# Patient Record
(demographics unavailable — no encounter records)

---

## 2024-11-04 NOTE — CARDIOLOGY SUMMARY
[de-identified] : EKGs from the hospital reviewed which is showing normal sinus rhythm with PACs EKG done today March 22, 2024 normal sinus rhythm/sinus bradycardia.  PACs.  Possible anteroseptal infarct of undetermined age. [de-identified] : Event monitors.  2024.  Multiple episodes of brief PSVT.  No significant long episodes to suggest atrial fibrillation. [de-identified] : Echocardiogram.  January 28, 2024 EF 55 to 60% moderate LV diastolic dysfunction PASP 35 mild MR TR moderate aortic stenosis and aortic regurgitation.

## 2024-11-04 NOTE — REASON FOR VISIT
[Arrhythmia/ECG Abnorrmalities] : arrhythmia/ECG abnormalities [Structural Heart and Valve Disease] : structural heart and valve disease [Hypertension] : hypertension [FreeTextEntry3] : dR. Ramachandran [FreeTextEntry1] : MATILDE BARNHART  is a 90 year F  who presents today Nov 04, 2024 in clinical follow-up. Last week she noticed increased palpitations. Evaluated at walk in clinic and given Metoprolol 50mg QD and instructed to follow up. She did not take the Metoprolol today and HR's in the 50's. Palpitations are better today.   Overall she has been feeling well. There has been no recent illness or hospital stay. Asymptomatic from cardiovascular standpoint.    90-year-old white female is referred to me for consultation in presence of history of 1.  Essential hypertension without any history of CHF CKD.  She is non-smoker 2.  History of transient atrial fibrillation during hospital admission for pneumonia and UTI January 28, 2024 hospital admission at [facility] [place].  Was started on Eliquis 2.5 mg twice daily.  Without any complications so far.  No symptomatic episodes of atrial fibrillation. 3.  History of borderline pulmonary hypertension, moderate aortic stenosis/aortic regurgitation with preserved LV ejection fraction. 4.  Gastroesophageal reflux disease.

## 2024-11-04 NOTE — ADDENDUM
[FreeTextEntry1] : I been asked to do preoperative cardiac assessment prior to epidural injection and management. Based on when I saw her on August 22, 2024 she is stable from cardiovascular point of view. Considering she is on NOAC for her atrial fibrillation which was paroxysmal Would recommend to hold Xarelto at least for 48-72 hours. Follow the protocol from pain management regarding duration of holding anticoagulation. Risk benefits alternatives should be noted when you hold anticoagulation for any procedure. Please call us for any questions.

## 2024-11-04 NOTE — PHYSICAL EXAM
[Well Developed] : well developed [Well Nourished] : well nourished [No Acute Distress] : no acute distress [Normal S1, S2] : normal S1, S2 [Murmur] : murmur [Clear Lung Fields] : clear lung fields [Normal Gait] : normal gait [No Edema] : no edema [Normal Radial B/L] : normal radial B/L [Normal Speech] : normal speech [Alert and Oriented] : alert and oriented

## 2024-11-04 NOTE — REVIEW OF SYSTEMS
[Hearing Loss] : hearing loss [Heartburn] : heartburn [Negative] : Heme/Lymph [FreeTextEntry5] : As per HPI

## 2024-11-04 NOTE — DISCUSSION/SUMMARY
[FreeTextEntry1] : MATILDE BARNHART  is a 90 year F  who presents today Nov 04, 2024 with the above history and the following active issues.   Paroxysmal atrial fibrillation.  [id number] 2 score is 4.  I have reviewed risk benefits alternatives of NOAC at her age in relation to bleeding risk versus prevention of cardioembolic event.  On low-dose Eliquis based on her age and weight.  High risk medication use.  Given recent symptoms of palpitations recommend 1 week ZIO and follow-up labs. Hold off on taking Metoprolol at this time until we get the information from [facility].  EKG today demonstrates SR  Moderate aortic stenosis aortic regurgitation mild MR TR and borderline pulmonary pressures.  EF 55 to 60%.  Clinically no signs of left or right heart failure.  Surveillance echocardiogram in future or for change in clinical status.  Discussed with her.  No indication for intervention at present.  Essential hypertension.  No CHF.  No CKD.  Non-smoker.  Continue to follow.  Continue present regimen medication.  Labs ordered.  Sinus bradycardia.  Asymptomatic.  Moderate aortic stenosis and mitral regurgitation preserved EF.    Red flag symptoms which would warrant sooner emergent evaluation reviewed with the patient.  Questions and concerns were addressed and answered.  Limitations of non-invasive testing reviewed  Sincerely,  Jeanne Coy PA-C Patients history, testing and plan reviewed with supervising MD: Dr. Dominag Vidal

## 2024-12-12 NOTE — REASON FOR VISIT
[Arrhythmia/ECG Abnorrmalities] : arrhythmia/ECG abnormalities [Structural Heart and Valve Disease] : structural heart and valve disease [Hypertension] : hypertension [FreeTextEntry3] : dR. Ramachandran [FreeTextEntry1] : MATILDE BARNHART  is a 90 year F  who presents today in clinical follow-up. No further palpitations.  Has not been taking her metoprolol.   Overall she has been feeling well. There has been no recent illness or hospital stay. Asymptomatic from cardiovascular standpoint.    90-year-old white female is referred to me for consultation in presence of history of 1.  Essential hypertension without any history of CHF CKD.  She is non-smoker 2.  History of transient atrial fibrillation during hospital admission for pneumonia and UTI January 28, 2024 hospital admission at [facility] [place].  Was started on Eliquis 2.5 mg twice daily.  Now on Xarelto.  Without any complications so far.  No symptomatic episodes of atrial fibrillation. 3.  History of borderline pulmonary hypertension, moderate aortic stenosis/aortic regurgitation with preserved LV ejection fraction. 4.  Gastroesophageal reflux disease.

## 2024-12-12 NOTE — PHYSICAL EXAM
[Frail] : frail [Normal S1, S2] : normal S1, S2 [Murmur] : murmur [Clear Lung Fields] : clear lung fields [No Edema] : no edema [de-identified] : kee 2-3/6

## 2024-12-12 NOTE — ASSESSMENT
[FreeTextEntry1] : Reviewed on December 12, 2024. EKG from today reviewed showing normal sinus rhythm/sinus bradycardia Recent labs from November 8, 2024 CBC hemoglobin 11.5 platelet count 188 sodium 139 potassium 4.3 creatinine 0.68 GFR 82  TSH 2.03 Event monitor reviewed.

## 2024-12-12 NOTE — CARDIOLOGY SUMMARY
[de-identified] : December 12, 2024.  Normal sinus rhythm next EKGs from the hospital reviewed which is showing normal sinus rhythm with PACs EKG done today March 22, 2024 normal sinus rhythm/sinus bradycardia.  PACs.  Possible anteroseptal infarct of undetermined age. [de-identified] : Event monitors.  2024.  Multiple episodes of brief PSVT.  No significant long episodes to suggest atrial fibrillation. Event monitor November 2024.  PSVT.  Was unable to keep the monitor on because of skin reaction. [de-identified] : Echocardiogram.  January 28, 2024 EF 55 to 60% moderate LV diastolic dysfunction PASP 35 mild MR TR moderate aortic stenosis and aortic regurgitation.

## 2024-12-12 NOTE — DISCUSSION/SUMMARY
[FreeTextEntry1] : MATILDE BARNHART  is a 90 year F  who presents today  with the above history and the following active issues.   Paroxysmal atrial fibrillation.  [id number] 2 score is 4.  I have reviewed risk benefits alternatives of NOAC at her age in relation to bleeding risk versus prevention of cardioembolic event.  On low-dose Eliquis based on her age and weight.  High risk medication use.  Hold off on taking Metoprolol at this time until we get the information from [facility].  As needed use of metoprolol recommended. EKG today demonstrates SR  Moderate aortic stenosis aortic regurgitation mild MR TR and borderline pulmonary pressures.  EF 55 to 60%.  Clinically no signs of left or right heart failure.  Surveillance echocardiogram in future or for change in clinical status.  Discussed with her.  No indication for intervention at present.  Essential hypertension.  No CHF.  No CKD.  Non-smoker.  Continue to follow.  Continue present regimen medication.  Labs ordered.  Sinus bradycardia.  Asymptomatic.  Metoprolol to be used as needed for PAF or palpitations.   Red flag symptoms which would warrant sooner emergent evaluation reviewed with the patient.  Questions and concerns were addressed and answered.  Limitations of non-invasive testing reviewed All the above were at length reviewed. Answered all the questions. Thank you very much for this kind referral. Please do not hesitate to give me a call for any question. Part of this transcription was done with voice recognition software and phonetically similar errors are common. I apologize for that. Please do not hesitate to call for any questions due to above.   Sincerely,   Dominga Vidal MD, [facility], [place]   [EKG obtained to assist in diagnosis and management of assessed problem(s)] : EKG obtained to assist in diagnosis and management of assessed problem(s)

## 2025-06-23 NOTE — DISCUSSION/SUMMARY
[FreeTextEntry1] : MATILDE BARNHART  is a 90 year F  who presents today Nov 04, 2024 with the above history and the following active issues.   Paroxysmal atrial fibrillation.  [id number] 2 score is 4.  I have reviewed risk benefits alternatives of NOAC at her age in relation to bleeding risk versus prevention of cardioembolic event.  On low-dose Eliquis based on her age and weight.  High risk medication use.  Given recent symptoms of palpitations recommend 1 week ZIO and follow-up labs. Hold off on taking Metoprolol at this time until we get the information from [facility].  EKG today demonstrates SR  Moderate aortic stenosis aortic regurgitation mild MR TR and borderline pulmonary pressures.  EF 55 to 60%.  Clinically no signs of left or right heart failure.  Surveillance echocardiogram in future or for change in clinical status.  Discussed with her.  No indication for intervention at present.  Essential hypertension.  No CHF.  No CKD.  Non-smoker.  Continue to follow.  Continue present regimen medication.  Labs ordered.  Sinus bradycardia.  Asymptomatic.  Moderate aortic stenosis and mitral regurgitation preserved EF.    Red flag symptoms which would warrant sooner emergent evaluation reviewed with the patient.  Questions and concerns were addressed and answered.  Limitations of non-invasive testing reviewed  Sincerely,  Jeanne Coy PA-C Patients history, testing and plan reviewed with supervising MD: Dr. Dominga Vidal

## 2025-06-23 NOTE — CARDIOLOGY SUMMARY
[de-identified] : EKGs from the hospital reviewed which is showing normal sinus rhythm with PACs EKG done today March 22, 2024 normal sinus rhythm/sinus bradycardia.  PACs.  Possible anteroseptal infarct of undetermined age. [de-identified] : Event monitors.  2024.  Multiple episodes of brief PSVT.  No significant long episodes to suggest atrial fibrillation. [de-identified] : Echocardiogram.  January 28, 2024 EF 55 to 60% moderate LV diastolic dysfunction PASP 35 mild MR TR moderate aortic stenosis and aortic regurgitation.

## 2025-06-23 NOTE — REASON FOR VISIT
[Arrhythmia/ECG Abnorrmalities] : arrhythmia/ECG abnormalities [Structural Heart and Valve Disease] : structural heart and valve disease [Hypertension] : hypertension [FreeTextEntry3] : dR. Ramachandran [FreeTextEntry1] : MATILDE BARNHART  is a 90 year F  who presents today Nov 04, 2024 in clinical follow-up. Last week she noticed increased palpitations. Evaluated at walk in clinic and given Metoprolol 50mg QD and instructed to follow up. She did not take the Metoprolol today and HR's in the 50's. Palpitations are better today.   Overall she has been feeling well. There has been no recent illness or hospital stay. Asymptomatic from cardiovascular standpoint.    90-year-old white female is referred to me for consultation in presence of history of 1.  Essential hypertension without any history of CHF CKD.  She is non-smoker 2.  History of transient atrial fibrillation during hospital admission for pneumonia and UTI January 28, 2024 hospital admission at [facility] [place].  Was started on Eliquis 2.5 mg twice daily.  Without any complications so far.  No symptomatic episodes of atrial fibrillation. 3.  History of borderline pulmonary hypertension, moderate aortic stenosis/aortic regurgitation with preserved LV ejection fraction. 4.  Gastroesophageal reflux disease.

## 2025-06-26 NOTE — CARDIOLOGY SUMMARY
[de-identified] : December 12, 2024.  Normal sinus rhythm next EKGs from the hospital reviewed which is showing normal sinus rhythm with PACs EKG done today March 22, 2024 normal sinus rhythm/sinus bradycardia.  PACs.  Possible anteroseptal infarct of undetermined age. 6/26/27 nsr ASWMI  [de-identified] : Event monitors.  2024.  Multiple episodes of brief PSVT.  No significant long episodes to suggest atrial fibrillation. Event monitor November 2024.  PSVT.  Was unable to keep the monitor on because of skin reaction. [de-identified] : Echocardiogram.  January 28, 2024 EF 55 to 60% moderate LV diastolic dysfunction PASP 35 mild MR TR moderate aortic stenosis and aortic regurgitation.

## 2025-06-26 NOTE — DISCUSSION/SUMMARY
[FreeTextEntry1] : MATILDE BARNHART  is a 91 year F  who presents today  with the above history and the following active issues.   s/p fall . # right ankle. rehab X 6 weeks. now with left ankle edema.  DVT studies  labs  echo   Paroxysmal atrial fibrillation.  [id number] 2 score is 4.  I have reviewed risk benefits alternatives of NOAC at her age in relation to bleeding risk versus prevention of cardioembolic event. on xarelto. if needed lower dose for lower GFR. check BMP. Hold off on taking Metoprolol at this time until we get the information from [facility].  As needed use of metoprolol recommended. EKG today demonstrates SR  Moderate aortic stenosis aortic regurgitation mild MR TR and borderline pulmonary pressures.  EF 55 to 60%.  Clinically no signs of left or right heart failure.  Surveillance echocardiogram in future or for change in clinical status.  Discussed with her.  No indication for intervention at present.  Essential hypertension.  No CHF.  No CKD.  Non-smoker.  Continue to follow.  Continue present regimen medication.  Labs ordered. low BP. stop amldopine   Sinus bradycardia.  Asymptomatic.  Metoprolol to be used as needed for PAF or palpitations.   Red flag symptoms which would warrant sooner emergent evaluation reviewed with the patient.  Questions and concerns were addressed and answered.  Limitations of non-invasive testing reviewed All the above were at length reviewed. Answered all the questions. Thank you very much for this kind referral. Please do not hesitate to give me a call for any question. Part of this transcription was done with voice recognition software and phonetically similar errors are common. I apologize for that. Please do not hesitate to call for any questions due to above.   Sincerely,   Dominag Vidal MD, [facility], [place]    [EKG obtained to assist in diagnosis and management of assessed problem(s)] : EKG obtained to assist in diagnosis and management of assessed problem(s)

## 2025-06-26 NOTE — REASON FOR VISIT
[Arrhythmia/ECG Abnorrmalities] : arrhythmia/ECG abnormalities [Structural Heart and Valve Disease] : structural heart and valve disease [Hypertension] : hypertension [FreeTextEntry3] : dR. Ramachandran [FreeTextEntry1] : MATILDE BARNHART  is a 91 year F  who presents today in clinical follow-up. No further palpitations.  Has not been taking her metoprolol.   Overall she has been feeling well. There has been no recent illness or hospital stay. Asymptomatic from cardiovascular standpoint.   history of 1.  Essential hypertension without any history of CHF CKD.  She is non-smoker 2.  History of transient atrial fibrillation during hospital admission for pneumonia and UTI January 28, 2024 hospital admission at [facility] [place].  Was started on Eliquis 2.5 mg twice daily.  Now on Xarelto.  Without any complications so far.  No symptomatic episodes of atrial fibrillation. 3.  History of borderline pulmonary hypertension, moderate aortic stenosis/aortic regurgitation with preserved LV ejection fraction. 4.  Gastroesophageal reflux disease.

## 2025-06-26 NOTE — ASSESSMENT
[FreeTextEntry1] : Reviewed on December 12, 2024. EKG from today reviewed showing normal sinus rhythm/sinus bradycardia Recent labs from November 8, 2024 CBC hemoglobin 11.5 platelet count 188 sodium 139 potassium 4.3 creatinine 0.68 GFR 82  TSH 2.03 Event monitor reviewed.  reviewed 6/26/25 ekg reviewed  recent labs from 4/25 reviewed

## 2025-06-26 NOTE — PHYSICAL EXAM
[No Acute Distress] : no acute distress [Normal S1, S2] : normal S1, S2 [Murmur] : murmur [Clear Lung Fields] : clear lung fields [Edema ___] : edema [unfilled]

## 2025-07-10 NOTE — REVIEW OF SYSTEMS
[Hearing Loss] : hearing loss [Heartburn] : heartburn [Negative] : Heme/Lymph [FreeTextEntry2] : as per hpi  [FreeTextEntry5] : As per HPI

## 2025-07-10 NOTE — CARDIOLOGY SUMMARY
[de-identified] : December 12, 2024.  Normal sinus rhythm next EKGs from the hospital reviewed which is showing normal sinus rhythm with PACs EKG done today March 22, 2024 normal sinus rhythm/sinus bradycardia.  PACs.  Possible anteroseptal infarct of undetermined age. 6/26/27 nsr ASWMI  [de-identified] : Event monitors.  2024.  Multiple episodes of brief PSVT.  No significant long episodes to suggest atrial fibrillation. Event monitor November 2024.  PSVT.  Was unable to keep the monitor on because of skin reaction. [de-identified] : Echocardiogram.  January 28, 2024 EF 55 to 60% moderate LV diastolic dysfunction PASP 35 mild MR TR moderate aortic stenosis and aortic regurgitation. [de-identified] : DVT studies.  NegativeJuly 2, 2025

## 2025-07-10 NOTE — DISCUSSION/SUMMARY
[FreeTextEntry1] : MATILDE BARNHART  is a 91 year F  who presents today  with the above history and the following active issues.   Lower extremity edema resolved.  Venous Doppler negative for DVT.  Stay off amlodipine.  Anemia.  Iron studies B12 level.  Repeat CBC.  Follow-up with PMD.  On anticoagulation.  If further evidence of blood loss or decreasing hemoglobin we may have to hold Xarelto while evaluation is being done.  Paroxysmal atrial fibrillation.  [id number] 2 score is 4.  I have reviewed risk benefits alternatives of NOAC at her age in relation to bleeding risk versus prevention of cardioembolic event. on xarelto. if needed lower dose for lower GFR.  Continue with metoprolol and losartan. EKG today demonstrates SR  Moderate aortic stenosis aortic regurgitation mild MR TR and borderline pulmonary pressures.  EF 55 to 60%.  Clinically no signs of left or right heart failure.  Surveillance echocardiogram in future or for change in clinical status.  Discussed with her.  No indication for intervention at present.  Essential hypertension.  No CHF.  No CKD.  Non-smoker.  Continue to follow.  Continue present regimen medication.  Labs ordered. low BP. stop amldopine Continue losartan.  Sinus bradycardia.  Asymptomatic.  Metoprolol to be used as needed for PAF or palpitations.   Red flag symptoms which would warrant sooner emergent evaluation reviewed with the patient.  Questions and concerns were addressed and answered.  Limitations of non-invasive testing reviewed All the above were at length reviewed. Answered all the questions. Thank you very much for this kind referral. Please do not hesitate to give me a call for any question. Part of this transcription was done with voice recognition software and phonetically similar errors are common. I apologize for that. Please do not hesitate to call for any questions due to above.   Sincerely,   Dominga Vidal MD, [facility], [place]

## 2025-07-10 NOTE — REASON FOR VISIT
[Arrhythmia/ECG Abnorrmalities] : arrhythmia/ECG abnormalities [Structural Heart and Valve Disease] : structural heart and valve disease [Hypertension] : hypertension [FreeTextEntry3] : dR. Ramachandran [FreeTextEntry1] : MATILDE BARNHART  is a 91 year F  who presents today in clinical follow-up. No further palpitations.  Her ankle edema has resolved on holding/stopping amlodipine.  She is walking with a walker without any new complaints.  She is here with her caretaker.  Overall she has been feeling well. There has been no recent illness or hospital stay.   history of 1.  Essential hypertension without any history of CHF CKD.  She is non-smoker 2.  History of transient atrial fibrillation during hospital admission for pneumonia and UTI [place] 2024 [place] [facility] [place]  Was started on Eliquis 2.5 mg twice daily.  Now on Xarelto.  Without any complications so far.  No symptomatic episodes of atrial fibrillation. 3.  History of borderline pulmonary hypertension, moderate aortic stenosis/aortic regurgitation with preserved LV ejection fraction. 4.  Gastroesophageal reflux disease.

## 2025-07-10 NOTE — ASSESSMENT
[FreeTextEntry1] : Reviewed on December 12, 2024. EKG from today reviewed showing normal sinus rhythm/sinus bradycardia Recent labs from November 8, 2024 CBC hemoglobin 11.5 platelet count 188 sodium 139 potassium 4.3 creatinine 0.68 GFR 82  TSH 2.03 Event monitor reviewed.  reviewed 6/26/25 ekg reviewed  recent labs from 4/25 reviewed   Reviewed on July 10, 2025. Her recent labs reviewed showing hemoglobin 9.8 LFT normal N-terminal proBNP 1176 Venous Doppler study reviewed

## 2025-07-10 NOTE — PHYSICAL EXAM
[Normal S1, S2] : normal S1, S2 [Murmur] : murmur [Clear Lung Fields] : clear lung fields [Abnormal Gait] : abnormal gait [No Edema] : no edema